# Patient Record
Sex: FEMALE | Race: WHITE | Employment: UNEMPLOYED | URBAN - METROPOLITAN AREA
[De-identification: names, ages, dates, MRNs, and addresses within clinical notes are randomized per-mention and may not be internally consistent; named-entity substitution may affect disease eponyms.]

---

## 2017-05-25 PROCEDURE — 86038 ANTINUCLEAR ANTIBODIES: CPT | Performed by: INTERNAL MEDICINE

## 2017-05-25 PROCEDURE — 86800 THYROGLOBULIN ANTIBODY: CPT | Performed by: INTERNAL MEDICINE

## 2017-05-25 PROCEDURE — 86376 MICROSOMAL ANTIBODY EACH: CPT | Performed by: INTERNAL MEDICINE

## 2017-09-01 ENCOUNTER — HOSPITAL ENCOUNTER (OUTPATIENT)
Dept: MAMMOGRAPHY | Facility: HOSPITAL | Age: 52
Discharge: HOME | End: 2017-09-01
Attending: SURGERY
Payer: COMMERCIAL

## 2017-09-01 DIAGNOSIS — D05.01 NEOPLASM OF RIGHT BREAST, PRIMARY TUMOR STAGING CATEGORY TIS: LOBULAR CARCINOMA IN SITU (LCIS): ICD-10-CM

## 2017-09-01 PROCEDURE — 76641 ULTRASOUND BREAST COMPLETE: CPT | Performed by: SURGERY

## 2017-09-27 ENCOUNTER — OFFICE VISIT (OUTPATIENT)
Dept: SURGERY | Facility: CLINIC | Age: 52
End: 2017-09-27

## 2017-09-27 VITALS
DIASTOLIC BLOOD PRESSURE: 65 MMHG | HEART RATE: 76 BPM | HEIGHT: 66 IN | SYSTOLIC BLOOD PRESSURE: 100 MMHG | TEMPERATURE: 98 F | RESPIRATION RATE: 16 BRPM | WEIGHT: 95 LBS | BODY MASS INDEX: 15.27 KG/M2

## 2017-09-27 DIAGNOSIS — D05.01 NEOPLASM OF RIGHT BREAST, PRIMARY TUMOR STAGING CATEGORY TIS: LOBULAR CARCINOMA IN SITU (LCIS): Primary | ICD-10-CM

## 2017-09-27 PROCEDURE — 99213 OFFICE O/P EST LOW 20 MIN: CPT | Performed by: SURGERY

## 2017-09-27 NOTE — PROGRESS NOTES
Follow Up Visit Note       Active Problems      1.  Neoplasm of right breast, primary tumor staging category Tis: lobular carcinoma in situ (LCIS)          Chief Complaint   Patient presents with:  Breast Problem: est ot here for a f/up of her breast US don Non-celiac gluten sensitivity    • Pap smear for cervical cancer screening 04/10/2012    wnl, hpv neg   • PONV (postoperative nausea and vomiting)    • SEXUALLY TRANSMITTED DISEASE     pos hpv   • Sexually transmitted disease     HPV   • Thyroid disease Comment: socially    Drug use:  Yes                Types: Cannabis    Sexual activity: Yes               Partners with: Male    Other Topics            Concern  Caffeine Concern        No  Exercise                Yes    Comment:2x for 15 mins yoga Neck supple. No JVD present. Pulmonary/Chest: She exhibits no mass. Right breast exhibits no inverted nipple, no mass, no nipple discharge, no skin change and no tenderness.  Left breast exhibits no inverted nipple, no mass, no nipple discharge, no skin c department that she is only to undergo ultrasound at this time. These findings seen today on ultrasound also require 6 month followup. BIRADS Code 3: PROBABLY BENIGN FINDING. RECOMMENDATIONS:    FOLLOW-UP: Clinical correlation is recommended.

## 2017-11-14 ENCOUNTER — TELEPHONE (OUTPATIENT)
Dept: FAMILY MEDICINE CLINIC | Facility: CLINIC | Age: 52
End: 2017-11-14

## 2017-11-14 NOTE — TELEPHONE ENCOUNTER
Pt went to [address] [place] [address] and gen surgery doc they both think she has fibromyalgia and lita sascial syndrome pt does not know if she needs to go to [facility] or PCP she is [facility] which her pain management doctor said she needs howev

## 2017-11-15 NOTE — TELEPHONE ENCOUNTER
We do not do the medical marijuana card. She will need to get that through her pain doctor. Usually pain doctors will also manage fibromyalgia. If they are unwilling to do that my next suggestion would be to come in to talk with us.

## 2017-11-15 NOTE — TELEPHONE ENCOUNTER
Pt given apt mid dec  1/2 hr to discuss. Dr Turner Ng ,pain Dr will Rx if she has fibromyalgia. Rheumatology 3 months out. Just TED

## 2018-03-08 DIAGNOSIS — D05.00 NEOPLASM OF BREAST, PRIMARY TUMOR STAGING CATEGORY TIS: LOBULAR CARCINOMA IN SITU (LCIS), UNSPECIFIED LATERALITY: Primary | ICD-10-CM

## 2018-07-09 ENCOUNTER — TELEPHONE (OUTPATIENT)
Dept: SURGERY | Facility: CLINIC | Age: 53
End: 2018-07-09

## 2018-07-09 NOTE — TELEPHONE ENCOUNTER
Pt left VM requesting to speak with billing  Called patient to provide with phone number, no answer and unable to leave message as no VM provided. Will attempt to call back later.

## 2018-07-23 ENCOUNTER — HOSPITAL ENCOUNTER (OUTPATIENT)
Dept: MAMMOGRAPHY | Facility: HOSPITAL | Age: 53
Discharge: HOME | End: 2018-07-23
Attending: SURGERY
Payer: COMMERCIAL

## 2018-07-23 DIAGNOSIS — D05.00 NEOPLASM OF BREAST, PRIMARY TUMOR STAGING CATEGORY TIS: LOBULAR CARCINOMA IN SITU (LCIS), UNSPECIFIED LATERALITY: ICD-10-CM

## 2018-07-23 PROCEDURE — 77062 BREAST TOMOSYNTHESIS BI: CPT | Performed by: SURGERY

## 2018-07-23 PROCEDURE — 77066 DX MAMMO INCL CAD BI: CPT | Performed by: SURGERY

## 2018-07-23 PROCEDURE — 76641 ULTRASOUND BREAST COMPLETE: CPT | Performed by: SURGERY

## 2018-07-24 ENCOUNTER — RT VISIT (OUTPATIENT)
Dept: RESPIRATORY THERAPY | Facility: HOSPITAL | Age: 53
End: 2018-07-24
Attending: NURSE PRACTITIONER
Payer: COMMERCIAL

## 2018-07-24 DIAGNOSIS — R06.02 SHORTNESS OF BREATH: ICD-10-CM

## 2018-07-24 PROCEDURE — 94729 DIFFUSING CAPACITY: CPT

## 2018-07-24 PROCEDURE — 94726 PLETHYSMOGRAPHY LUNG VOLUMES: CPT

## 2018-07-24 PROCEDURE — 94060 EVALUATION OF WHEEZING: CPT

## 2018-07-25 NOTE — PROCEDURES
Spirometry and  flow volume loop show evidence of borderline mild  obstruction. .    There was no change in spirometry after bronchodilator challenge.       Normal TLC, no evidence of restriction  There is an  increase in the RV suggesting possible air tra

## 2018-08-20 ENCOUNTER — CHARTING TRANS (OUTPATIENT)
Dept: OTHER | Age: 53
End: 2018-08-20

## 2018-08-29 ENCOUNTER — CHARTING TRANS (OUTPATIENT)
Dept: OTHER | Age: 53
End: 2018-08-29

## 2018-09-05 ENCOUNTER — CHARTING TRANS (OUTPATIENT)
Dept: OTHER | Age: 53
End: 2018-09-05

## 2018-09-17 ENCOUNTER — CHARTING TRANS (OUTPATIENT)
Dept: OTHER | Age: 53
End: 2018-09-17

## 2018-11-27 VITALS
DIASTOLIC BLOOD PRESSURE: 65 MMHG | HEART RATE: 68 BPM | OXYGEN SATURATION: 100 % | RESPIRATION RATE: 12 BRPM | WEIGHT: 111 LBS | HEIGHT: 66 IN | BODY MASS INDEX: 17.84 KG/M2 | SYSTOLIC BLOOD PRESSURE: 98 MMHG

## 2018-11-27 VITALS
WEIGHT: 113.98 LBS | DIASTOLIC BLOOD PRESSURE: 71 MMHG | SYSTOLIC BLOOD PRESSURE: 112 MMHG | HEART RATE: 62 BPM | BODY MASS INDEX: 18.32 KG/M2 | HEIGHT: 66 IN

## 2018-12-13 ENCOUNTER — LAB ENCOUNTER (OUTPATIENT)
Dept: LAB | Age: 53
End: 2018-12-13
Attending: FAMILY MEDICINE
Payer: COMMERCIAL

## 2018-12-13 DIAGNOSIS — E72.10 DISORDER OF SULFUR-BEARING AMINO ACID METABOLISM (HCC): ICD-10-CM

## 2018-12-13 DIAGNOSIS — R53.83 FATIGUE: ICD-10-CM

## 2018-12-13 DIAGNOSIS — R19.8 OTHER SPECIFIED SYMPTOMS AND SIGNS INVOLVING THE DIGESTIVE SYSTEM AND ABDOMEN: Primary | ICD-10-CM

## 2019-01-04 RX ORDER — FLUTICASONE PROPIONATE AND SALMETEROL 100; 50 UG/1; UG/1
POWDER RESPIRATORY (INHALATION)
COMMUNITY
Start: 2018-09-05 | End: 2019-09-05

## 2019-01-16 ENCOUNTER — TELEPHONE (OUTPATIENT)
Dept: SURGERY | Facility: CLINIC | Age: 54
End: 2019-01-16

## 2021-02-22 ENCOUNTER — TELEPHONE (OUTPATIENT)
Dept: SURGERY | Facility: CLINIC | Age: 56
End: 2021-02-22

## 2021-02-22 NOTE — TELEPHONE ENCOUNTER
Attempted to return patient call from our voicemail. No answer. Left message to call the office. Patient called regarding her breast surgery/clip from 2015 with Dr. Monet Santos.    Per Dr. Monet Santos the clip is no longer there and patient is due for a mammogram.

## 2021-02-23 ENCOUNTER — TELEPHONE (OUTPATIENT)
Dept: SURGERY | Facility: CLINIC | Age: 56
End: 2021-02-23

## 2021-02-23 DIAGNOSIS — D05.00 LOBULAR CARCINOMA IN SITU (LCIS) OF BREAST, UNSPECIFIED LATERALITY: Primary | ICD-10-CM

## 2021-02-23 NOTE — TELEPHONE ENCOUNTER
Called pt back that Dr. Paresh Cabrera had reviewed mammogram and no clips present. Also informed pt that she is due for mammogram. Order entered.

## (undated) NOTE — Clinical Note
I had the pleasure of seeing Vanessa Toscano on 9/27/2017. Please see my attached note.   Roger Plascencia MD FACS [facility]Surgery

## (undated) NOTE — LETTER
03/25/21        Mancil Brand  [address] [id number]      Dear Sena Bueno,    [facility] records indicate that you have outstanding lab work and or testing that was ordered for you and has not yet been completed:  Orders Placed This Encount